# Patient Record
Sex: FEMALE | Race: WHITE | NOT HISPANIC OR LATINO | Employment: OTHER | URBAN - METROPOLITAN AREA
[De-identification: names, ages, dates, MRNs, and addresses within clinical notes are randomized per-mention and may not be internally consistent; named-entity substitution may affect disease eponyms.]

---

## 2018-12-11 ENCOUNTER — TRANSFERRED RECORDS (OUTPATIENT)
Dept: HEALTH INFORMATION MANAGEMENT | Facility: CLINIC | Age: 53
End: 2018-12-11

## 2018-12-14 ENCOUNTER — TELEPHONE (OUTPATIENT)
Dept: GASTROENTEROLOGY | Facility: CLINIC | Age: 53
End: 2018-12-14

## 2018-12-14 ENCOUNTER — CARE COORDINATION (OUTPATIENT)
Dept: GASTROENTEROLOGY | Facility: CLINIC | Age: 53
End: 2018-12-14

## 2018-12-14 DIAGNOSIS — R10.9 ABDOMINAL PAIN: Primary | ICD-10-CM

## 2018-12-14 NOTE — PROGRESS NOTES
"Advanced Endoscopy     Referring provider:Madeleine Jerez in [place], [place]    Referred to: [facility]     Provider Requested: Dr. Jernigan     Referral Received: 12/14/2018     Records received: Epic      Images received: n/a    Evaluation for:  Chronic pancreatitis     Clinical History (per RN review): She had an EUS and ERCP by Dr. Jernigan in 2009.     Complicated history of pancreatic and biliary disease: originally had a sphincter of oddi dysfunction and now numerous problems since then and multiple ERCP's and stent placement. S/p removal of obstructed stent.   Patient is taking ranitidine for chronic GERD   History of anxiety, depression, PTSD, \"stress seizures\", night terrors, chronic back pain, chronic headaches, chronic pancreatitis, fibromyalgia, IV drug use (meth) and drug seeking behavior.     MD review date: Routed to Dr. Jernigan on 12/21/2018  MD Decision for clinic consultation/Orders: Secretin MRCP and clinic          Referral updates/Patient contacted:   12/14/2018 Called referring but it continuously rang with no answer. Sent a fax to send medical records.     12/14/2018  Spoke with patient and advised her that we received her referral and we will be in touch once Dr. Jernigan reviews her records. She said she has records from a few places that she doesn't know the name of but will figure it out this weekend and will have them fax it to us.     1/2/19- Spoke with patient and advised her that he would like a secretin MRCP and see her in clinic. She would like a call next week. Advised that I don't schedule but will let them know to call her next week.   "

## 2018-12-14 NOTE — TELEPHONE ENCOUNTER
[facility] Clinic Intake form:    Referring/Requesting provider and Health care System: Madeleine Jerez in [place], [place]    Clinic contact - Name, Phone and Fax number: Clinic contact name unknown. Phone [phone]  Fax [phone]    Requested provider (if specified): Dr. Jernigan    Has patient been evaluated in clinic or had a procedure [facility] provider in the last 5 years: No      Indication/Diagnosis for consultation: Chronic pancreatitis    Is diagnosis on list of approved diagnosis: Yes    Has patient been evaluated by another Gastroenterologist? Unknown     Imaging completed:     CT scan     No   MRI          No        Procedures:     Upper Endoscopy/EGD   No     Endoscopic Ultrasound/EUS No    ERCP      Yes, patient was unsure of date or name of the facility ERCP was last done at    Colonoscopy    No      Are images able/being pushed to our system? Unknown    Is patient aware of request for clinc consultation and ok to be contacted to schedule? Yes, patient called     Patient states she has records from [place], [place], and [place]. Patient was advised that she needs to contact those facilities to have records and imaging sent. Patient stated she cannot remember the names of them.

## 2019-01-09 ENCOUNTER — TELEPHONE (OUTPATIENT)
Dept: GASTROENTEROLOGY | Facility: CLINIC | Age: 54
End: 2019-01-09

## 2019-01-09 NOTE — TELEPHONE ENCOUNTER
LVM for patient in regards to a message received about scheduling an appointment. Left call back number for patient to call and schedule an appointment.

## 2019-01-10 ENCOUNTER — TELEPHONE (OUTPATIENT)
Dept: GASTROENTEROLOGY | Facility: CLINIC | Age: 54
End: 2019-01-10

## 2019-01-10 NOTE — TELEPHONE ENCOUNTER
LVM for patient to confirm the date and time for her appointment with Dr. Jernigan and to get her MRCP. Left filiberto back number for patient to call if she has any questions or if she needs to reschedule.

## 2019-01-10 NOTE — TELEPHONE ENCOUNTER
[facility] Call Center    Phone Message    May a detailed message be left on voicemail: yes    Reason for Call: Other: Patient called and scheduled her MRCP but also requested an oral sedative. Please call patient to get Rx for her sedative.    Action Taken: Message routed to:  [facility] SANDY CUENCA

## 2019-01-11 ENCOUNTER — CARE COORDINATION (OUTPATIENT)
Dept: GASTROENTEROLOGY | Facility: CLINIC | Age: 54
End: 2019-01-11

## 2019-01-11 DIAGNOSIS — K86.1 CHRONIC PANCREATITIS (H): Primary | ICD-10-CM

## 2019-01-11 NOTE — PROGRESS NOTES
Received message from call center: Patient called and scheduled her MRCP but also requested an oral sedative. Please call patient to get Rx for her sedative.    Called and left voicemail advising that I received the message she left and talked with [employer] as they have been in contact a few times regarding this and wanting information about lodging and hotels.     Advised that I would call that prescription the day before her MRCP and she will pick it up that morning of the scan at our pharmacy. Advised that she will need a  and needs to arrive an hour early. Also that [employer] sent out the lodging information in a letter to her house. Gave clinic number for further questions/concerns.     [address] Dr. Mondragon, & Dr. Hickey  [facility]  [phone]

## 2019-01-29 ENCOUNTER — DOCUMENTATION ONLY (OUTPATIENT)
Dept: CARE COORDINATION | Facility: CLINIC | Age: 54
End: 2019-01-29

## 2019-03-13 ENCOUNTER — PATIENT OUTREACH (OUTPATIENT)
Dept: CARE COORDINATION | Facility: CLINIC | Age: 54
End: 2019-03-13
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
## 2019-04-15 ENCOUNTER — CARE COORDINATION (OUTPATIENT)
Dept: GASTROENTEROLOGY | Facility: CLINIC | Age: 54
End: 2019-04-15

## 2019-04-15 NOTE — PROGRESS NOTES
"Called patient at 1:50pm to inquire about her scheduled appointment today at 1pm. Sammi stated that she knew she had to cancel because of financial difficulites, then forgot to call us. I offered to reschedule and she said \"not at this time\". Confirmed she had our phone number.     Ling Anguiano RN Care Coordinator    "

## 2019-07-17 ENCOUNTER — TELEPHONE (OUTPATIENT)
Dept: GASTROENTEROLOGY | Facility: CLINIC | Age: 54
End: 2019-07-17

## 2019-07-17 NOTE — TELEPHONE ENCOUNTER
[facility] Call Center    Phone Message    May a detailed message be left on voicemail: yes    Reason for Call: Other: Patient needs to set up a new consult with Dr. Jernigan. Please call to schedule     Action Taken: Message routed to:  [facility] Sixto Laguerre

## 2019-07-17 NOTE — TELEPHONE ENCOUNTER
Returned call, left message for patient to call clinic to arrange visit with Dr. Delon Anguiano, RN Care Coordinator

## 2019-08-02 ENCOUNTER — TELEPHONE (OUTPATIENT)
Dept: GASTROENTEROLOGY | Facility: CLINIC | Age: 54
End: 2019-08-02

## 2019-08-02 DIAGNOSIS — F41.9 ANXIETY: Primary | ICD-10-CM

## 2019-08-02 RX ORDER — DIAZEPAM 5 MG
TABLET ORAL
Qty: 2 TABLET | Refills: 0 | Status: SHIPPED | OUTPATIENT
Start: 2019-08-02

## 2019-08-02 NOTE — TELEPHONE ENCOUNTER
Orders placed. Left message for pharmacy to call when ok to transcribe med.    Ling Anguiano, RN Care Coordinator

## 2019-08-02 NOTE — TELEPHONE ENCOUNTER
[facility] Call [facility]    Phone Message    May a detailed message be left on voicemail: yes    Reason for Call: Other: Pt requesting oral sedation, needs Rx sent to [facility] [address], [place] [address]     Action Taken: Message routed to:  [facility] Panc

## 2019-08-14 DIAGNOSIS — F41.9 ANXIETY: Primary | ICD-10-CM

## 2019-08-14 NOTE — PROGRESS NOTES
Patient called to report she picked up Valium and now cannot find it. She was to take it prior to her MRCP on 8/20. Wondering if she can have additional dose.    Will forward request to Dr. Delon Anguiano, RN Care Coordinator

## 2019-08-15 RX ORDER — DIAZEPAM 5 MG
TABLET ORAL
Qty: 1 TABLET | Refills: 0 | Status: SHIPPED | OUTPATIENT
Start: 2019-08-15

## 2019-08-15 NOTE — PROGRESS NOTES
Per Dr. Jernigan 5mg valium ordered and called into discharge pharmacy.     Left message for patient to update.     Ling Anguiano, RN Care Coordinator

## 2020-08-18 ENCOUNTER — TELEPHONE (OUTPATIENT)
Dept: GASTROENTEROLOGY | Facility: CLINIC | Age: 55
End: 2020-08-18

## 2020-08-18 NOTE — TELEPHONE ENCOUNTER
[facility] Call Center    Phone Message    May a detailed message be left on voicemail: yes     Reason for Call: Other: Please follow up with Elvis at [facility], to get pt scheduled for an appointment. Thank you      Action Taken: Message routed to:  [facility] Sixto Laguerre    Travel Screening: Not Applicable

## 2020-08-19 NOTE — TELEPHONE ENCOUNTER
Returned call to Elvis, noting we've never seen patient. She was referred in 2018 and 2019. Imaging was orders and she was provided valium prior to that but reported that she lost her valium, imaging never done.     Requested they call in a new referral for reason for referral, records, etc for review so we can appropriately triage and f/ up with patient.     Ling Anguiano, RN Care Coordinator

## 2020-08-19 NOTE — TELEPHONE ENCOUNTER
[facility] Call Center    Phone Message    May a detailed message be left on voicemail: yes     Reason for Call: Other: Gio, with [facility], calling in to speak with nurse regarding this appointment. Please follow up with Gio when available. Thank you      Action Taken: Message routed to:  [facility] Gastro    Travel Screening: Not Applicable

## 2020-08-19 NOTE — TELEPHONE ENCOUNTER
Called back, talked to referring NP to explain patient has not been seen in clinic and would need a new referral. Got cut off in the middle of the conversation by automated message.     Pt is currently in an inpatient psychiatric hospital.     Ling Anguiano, [facility] Care Coordinator

## 2022-01-01 ENCOUNTER — TRANSCRIBE ORDERS (OUTPATIENT)
Dept: OTHER | Age: 57
End: 2022-01-01

## 2022-01-01 ENCOUNTER — TELEPHONE (OUTPATIENT)
Dept: GASTROENTEROLOGY | Facility: CLINIC | Age: 57
End: 2022-01-01

## 2022-01-01 ENCOUNTER — DOCUMENTATION ONLY (OUTPATIENT)
Dept: GASTROENTEROLOGY | Facility: CLINIC | Age: 57
End: 2022-01-01

## 2022-01-01 DIAGNOSIS — K86.1 OTHER CHRONIC PANCREATITIS (H): Primary | ICD-10-CM

## 2022-11-17 NOTE — TELEPHONE ENCOUNTER
"Advanced Endoscopy     Referring provider:  Pt referred back to Dr. Jernigan by:  Referred by: Cat Eli NP @ [facility]    Referred to: [facility]     Provider Requested: Dr. Jernigan, last ERCP 2009     Referral Received: 11/17/22       Records received: Care everywhere, none from referring MD     Images received: none    Evaluation for: chronic pancreatitis     Clinical History (per RN review):     Referred by Primary     Saw Dr Thacker in June 2022  HPI:   57-year-old woman with history of traumatic brain injury, chronic pain on medications, With complains of dysphagia, sensation of food getting stuck in throat, also having marked reflux with liquid rising up to the back of the throat and throat pain. All of this going on for a few months with 100 pound weight loss over the course of 1 year. Also has had episode of rectal bleeding, hard stools that are painful to pass.  Reports that she is not able to eat to her satisfaction on account of pain and reflux.  Previously has had esophageal dilation and hiatus hernia surgery repair. Also reports that she may have some delayed gastric emptying \" dead stomach\"    CT w/o contrast July 2022    IMPRESSION:     1. There is no evidence of urolithiasis on either side. No evidence of current   or recent obstructive uropathy. Bladder is distended but otherwise unremarkable.   Left kidney is atrophic with renal cortical scarring.   2. Other incidental nonacute appearing findings as discussed above.       Last ERCP w/ Dr. Jernigan in 2009  Procedure:           ERCP   Indications:         Abdominal pain of in patient with idiopathic chornic                        pancreatitis (5-6/9 criteria on EUS), type I SOD, post                        biliary and pancreatic sphincterotomy by us recently                        without any sustained improvement. Assess for patencies                        of sphincterotomies.     MD review " date: 11/29/22    MD Decision for clinic consultation/Orders:            Referral updates/Patient contacted:

## 2022-11-30 NOTE — PROGRESS NOTES
11/30/22    Called two clinics to request images be pushed to [facility].     [facility], film room: [phone]. Echo will be sent.    [facility] [place], [place]  [phone]  All past images will be sent ASAP.    SK

## 2023-01-01 ENCOUNTER — DOCUMENTATION ONLY (OUTPATIENT)
Dept: GASTROENTEROLOGY | Facility: CLINIC | Age: 58
End: 2023-01-01
Payer: COMMERCIAL

## 2023-01-01 ENCOUNTER — OFFICE VISIT (OUTPATIENT)
Dept: GASTROENTEROLOGY | Facility: CLINIC | Age: 58
End: 2023-01-01
Payer: COMMERCIAL

## 2023-01-01 VITALS
DIASTOLIC BLOOD PRESSURE: 54 MMHG | HEIGHT: 66 IN | SYSTOLIC BLOOD PRESSURE: 113 MMHG | BODY MASS INDEX: 36.32 KG/M2 | WEIGHT: 226 LBS | HEART RATE: 77 BPM | OXYGEN SATURATION: 90 %

## 2023-01-01 DIAGNOSIS — K86.1 CHRONIC PANCREATITIS, UNSPECIFIED PANCREATITIS TYPE (H): ICD-10-CM

## 2023-01-01 DIAGNOSIS — E66.813 CLASS 3 SEVERE OBESITY DUE TO EXCESS CALORIES WITH SERIOUS COMORBIDITY IN ADULT, UNSPECIFIED BMI (H): ICD-10-CM

## 2023-01-01 DIAGNOSIS — E66.01 MORBID OBESITY (H): Primary | ICD-10-CM

## 2023-01-01 DIAGNOSIS — E66.01 CLASS 3 SEVERE OBESITY DUE TO EXCESS CALORIES WITH SERIOUS COMORBIDITY IN ADULT, UNSPECIFIED BMI (H): ICD-10-CM

## 2023-01-01 PROCEDURE — 99203 OFFICE O/P NEW LOW 30 MIN: CPT | Performed by: INTERNAL MEDICINE

## 2023-01-01 RX ORDER — PRAZOSIN HYDROCHLORIDE 2 MG/1
CAPSULE ORAL
COMMUNITY
Start: 2023-01-01

## 2023-01-01 RX ORDER — CETIRIZINE HYDROCHLORIDE 10 MG/1
1 TABLET ORAL
COMMUNITY
Start: 2022-01-01

## 2023-01-01 RX ORDER — SPIRONOLACTONE 50 MG/1
TABLET, FILM COATED ORAL
COMMUNITY
Start: 2023-01-01

## 2023-01-01 RX ORDER — PROMETHAZINE HYDROCHLORIDE 25 MG/1
25 TABLET ORAL
COMMUNITY

## 2023-01-01 RX ORDER — BISACODYL 5 MG
TABLET, DELAYED RELEASE (ENTERIC COATED) ORAL
COMMUNITY
Start: 2022-07-05

## 2023-01-01 RX ORDER — CHOLECALCIFEROL (VITAMIN D3) 50 MCG
TABLET ORAL
COMMUNITY
Start: 2023-01-01

## 2023-01-01 RX ORDER — POLYETHYLENE GLYCOL 3350 17 G/17G
POWDER, FOR SOLUTION ORAL
COMMUNITY
Start: 2023-01-01

## 2023-01-01 RX ORDER — TOPIRAMATE 50 MG/1
1 TABLET, FILM COATED ORAL
COMMUNITY
Start: 2022-01-01

## 2023-01-01 RX ORDER — LORATADINE 10 MG/1
1 TABLET ORAL
COMMUNITY
Start: 2023-01-01

## 2023-01-01 RX ORDER — ATORVASTATIN CALCIUM 40 MG/1
40 TABLET, FILM COATED ORAL AT BEDTIME
COMMUNITY
Start: 2023-01-01

## 2023-01-01 RX ORDER — LAMOTRIGINE 100 MG/1
TABLET ORAL
COMMUNITY
Start: 2022-01-01

## 2023-01-01 RX ORDER — POLYETHYLENE GLYCOL-3350 AND ELECTROLYTES 236; 6.74; 5.86; 2.97; 22.74 G/274.31G; G/274.31G; G/274.31G; G/274.31G; G/274.31G
POWDER, FOR SOLUTION ORAL
COMMUNITY
Start: 2022-01-01

## 2023-01-01 RX ORDER — ONDANSETRON 4 MG
TABLET,DISINTEGRATING ORAL
COMMUNITY

## 2023-01-01 RX ORDER — DOCUSATE SODIUM 50MG AND SENNOSIDES 8.6MG 8.6; 5 MG/1; MG/1
TABLET, FILM COATED ORAL
COMMUNITY
Start: 2023-01-01

## 2023-01-01 RX ORDER — FUROSEMIDE 20 MG
40 TABLET ORAL
COMMUNITY

## 2023-01-01 RX ORDER — PSEUDOEPHED/ACETAMINOPH/DIPHEN 30MG-500MG
TABLET ORAL
COMMUNITY
Start: 2022-06-13

## 2023-01-01 RX ORDER — FLUTICASONE PROPIONATE 50 MCG
SPRAY, SUSPENSION (ML) NASAL
COMMUNITY
Start: 2022-01-01

## 2023-01-01 RX ORDER — BACLOFEN 10 MG/1
TABLET ORAL
COMMUNITY
Start: 2023-01-01

## 2023-01-01 RX ORDER — PANTOPRAZOLE SODIUM 20 MG/1
TABLET, DELAYED RELEASE ORAL
COMMUNITY

## 2023-01-01 RX ORDER — PROPRANOLOL HYDROCHLORIDE 20 MG/1
20 TABLET ORAL
COMMUNITY
Start: 2021-07-16

## 2023-01-01 RX ORDER — ALBUTEROL SULFATE 90 UG/1
AEROSOL, METERED RESPIRATORY (INHALATION)
COMMUNITY
Start: 2023-01-01

## 2023-01-01 RX ORDER — CALCIUM CARBONATE 500(1250)
TABLET ORAL
COMMUNITY
Start: 2023-01-01

## 2023-01-01 RX ORDER — POTASSIUM CHLORIDE 750 MG/1
1 TABLET, EXTENDED RELEASE ORAL
COMMUNITY
Start: 2022-01-01

## 2023-01-01 RX ORDER — FOLIC ACID 1 MG/1
1 TABLET ORAL
COMMUNITY
Start: 2023-01-01

## 2023-01-01 RX ORDER — SULFAMETHOXAZOLE/TRIMETHOPRIM 800-160 MG
1 TABLET ORAL
COMMUNITY
Start: 2022-05-04

## 2023-01-01 RX ORDER — OMEPRAZOLE 40 MG/1
CAPSULE, DELAYED RELEASE ORAL
COMMUNITY
Start: 2022-05-21

## 2023-01-01 RX ORDER — OXCARBAZEPINE 150 MG/1
75 TABLET, FILM COATED ORAL
COMMUNITY
Start: 2022-01-01

## 2023-01-01 ASSESSMENT — PAIN SCALES - GENERAL: PAINLEVEL: EXTREME PAIN (8)

## 2023-02-09 NOTE — PROGRESS NOTES
Called PT and confirmed Appt.    Called to remind patient of their upcoming appointment with our GI clinic, on 02/15/23 at 11:00 AM with Dr. Sanchez Jernigan. This appointment is scheduled as an in-person appt. Please arrive 15 minutes early to check in for your appointment. , if your appointment is virtual (video or telephone) you need to be in [place] for the visit. To reschedule or cancel patient to call [phone].      SK

## 2023-02-15 NOTE — LETTER
Date:February 16, 2023      Provider requested that no letter be sent. Do not send.       [facility]
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
## 2023-02-15 NOTE — PROGRESS NOTES
"Chief Complaint   Patient presents with     New Patient       Vitals:    02/15/23 1137   BP: 113/54   BP Location: Right arm   Patient Position: Sitting   Cuff Size: Adult Large   Pulse: 77   SpO2: 90%   Weight: 102.5 kg (226 lb)   Height: 1.676 m (5' 6\")       Body mass index is 36.48 kg/m .    Florecita Brown    "

## 2023-02-15 NOTE — PATIENT INSTRUCTIONS
Follow up:    Dr. Jernigan has outlined the following steps after your recent clinic visit:    Bariatric referral, they will call to schedule.       Please call with any questions or concerns regarding your clinic visit today.    It is a pleasure being involved in your health care.    Contacts post-consultation depending on your need:    Schedule Clinic Appointments            [phone] # 1   M-F 7:30 - 5 pm    Ling Anguiano RN Care Coordinator (Dr. Mondragon/Dr. Jernigan)  [phone]    Margarita Da Silva RN Care Coordinator (Dr. Hickey)   [phone]    Barbie Bundy, RN Care Coordinator (Dr. Trinh/Dr. Segura)  [phone]     OR Procedure Scheduling                                 [phone]    For urgent/emergent questions after business hours, you may reach the on-call GI Fellow by contacting the [facility]  at [phone].    How do I schedule labs, imaging studies, or procedures that were ordered in clinic today?     Labs: To schedule lab appointment at the [facility], use my chart or call [phone]. If you have a [facility] lab closer to home where you are regularly seen you can give them a call.     Procedures: If a colonoscopy, upper endoscopy, breath test, esophageal manometry, or pH impedence was ordered today, our endoscopy team will call you to schedule this. If you have not heard from our endoscopy team within a week, please call [phone] to schedule.     Imaging Studies: If you were scheduled for a CT scan, X-ray, MRI, ultrasound, HIDA scan or other imaging study, please call [phone] to have this scheduled.     Referral: If a referral to another specialty was ordered, expect a phone call or follow instructions above. If you have not heard from anyone regarding your referral in a week, please call our clinic to check the status.     How to I schedule a follow-up visit?  If you did not schedule a follow-up visit today, please call [phone]  to schedule a follow-up office visit.